# Patient Record
Sex: MALE | Race: WHITE | NOT HISPANIC OR LATINO | Employment: OTHER | URBAN - METROPOLITAN AREA
[De-identification: names, ages, dates, MRNs, and addresses within clinical notes are randomized per-mention and may not be internally consistent; named-entity substitution may affect disease eponyms.]

---

## 2019-06-21 ENCOUNTER — TELEPHONE (OUTPATIENT)
Dept: CARDIOLOGY | Facility: CLINIC | Age: 79
End: 2019-06-21

## 2019-06-21 NOTE — TELEPHONE ENCOUNTER
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  Was told it would be sent this morning and she hadn't received yet.  Advsd Dr. Cortez will be seeing pts this afternoon and would address.     Please fax to [phone]

## 2019-07-10 RX ORDER — WARFARIN SODIUM 5 MG/1
TABLET ORAL
Qty: 30 TABLET | Refills: 0 | Status: SHIPPED | OUTPATIENT
Start: 2019-07-10 | End: 2019-08-12 | Stop reason: SDUPTHER

## 2019-07-30 ENCOUNTER — ANTICOAGULATION VISIT (OUTPATIENT)
Dept: CARDIOLOGY | Facility: CLINIC | Age: 79
End: 2019-07-30

## 2019-07-30 DIAGNOSIS — Z79.01 LONG TERM (CURRENT) USE OF ANTICOAGULANTS: ICD-10-CM

## 2019-07-30 DIAGNOSIS — I48.20 CHRONIC ATRIAL FIBRILLATION (HCC): ICD-10-CM

## 2019-07-30 PROBLEM — I48.91 ATRIAL FIBRILLATION (HCC): Status: ACTIVE | Noted: 2019-07-30

## 2019-07-30 LAB — INR PPP: 2.45 (ref 2–3)

## 2019-08-02 ENCOUNTER — CLINICAL SUPPORT NO REQUIREMENTS (OUTPATIENT)
Dept: CARDIOLOGY | Facility: CLINIC | Age: 79
End: 2019-08-02

## 2019-08-02 DIAGNOSIS — I48.0 PAROXYSMAL ATRIAL FIBRILLATION (HCC): ICD-10-CM

## 2019-08-02 DIAGNOSIS — I49.5 TACHY-BRADY SYNDROME (HCC): Primary | ICD-10-CM

## 2019-08-02 PROCEDURE — 93280 PM DEVICE PROGR EVAL DUAL: CPT | Performed by: INTERNAL MEDICINE

## 2019-08-12 DIAGNOSIS — I48.0 PAROXYSMAL ATRIAL FIBRILLATION (HCC): Primary | ICD-10-CM

## 2019-08-12 DIAGNOSIS — Z79.01 LONG TERM (CURRENT) USE OF ANTICOAGULANTS: ICD-10-CM

## 2019-08-12 RX ORDER — WARFARIN SODIUM 5 MG/1
TABLET ORAL
Qty: 30 TABLET | Refills: 0 | Status: SHIPPED | OUTPATIENT
Start: 2019-08-12 | End: 2019-09-18 | Stop reason: SDUPTHER

## 2019-08-27 ENCOUNTER — ANTICOAGULATION VISIT (OUTPATIENT)
Dept: CARDIOLOGY | Facility: CLINIC | Age: 79
End: 2019-08-27

## 2019-08-27 DIAGNOSIS — Z79.01 LONG TERM (CURRENT) USE OF ANTICOAGULANTS: ICD-10-CM

## 2019-08-27 DIAGNOSIS — I48.0 PAROXYSMAL ATRIAL FIBRILLATION (HCC): ICD-10-CM

## 2019-08-27 LAB — INR PPP: 2.04 (ref 2–3)

## 2019-09-18 DIAGNOSIS — I48.0 PAROXYSMAL ATRIAL FIBRILLATION (HCC): ICD-10-CM

## 2019-09-18 DIAGNOSIS — Z79.01 LONG TERM (CURRENT) USE OF ANTICOAGULANTS: ICD-10-CM

## 2019-09-18 RX ORDER — WARFARIN SODIUM 5 MG/1
TABLET ORAL
COMMUNITY
Start: 2018-12-07 | End: 2019-10-23 | Stop reason: SDUPTHER

## 2019-09-18 RX ORDER — PREDNISOLONE ACETATE 10 MG/ML
SUSPENSION/ DROPS OPHTHALMIC
Refills: 3 | COMMUNITY
Start: 2019-06-19

## 2019-09-18 RX ORDER — FUROSEMIDE 40 MG/1
20 TABLET ORAL DAILY
Refills: 0 | COMMUNITY
Start: 2019-07-12 | End: 2021-04-20 | Stop reason: ALTCHOICE

## 2019-09-18 RX ORDER — MOXIFLOXACIN 5 MG/ML
SOLUTION/ DROPS OPHTHALMIC
Refills: 3 | COMMUNITY
Start: 2019-06-19 | End: 2020-07-07

## 2019-09-18 RX ORDER — POTASSIUM CHLORIDE 750 MG/1
10 TABLET, FILM COATED, EXTENDED RELEASE ORAL DAILY
Refills: 12 | COMMUNITY
Start: 2019-07-23 | End: 2021-04-20 | Stop reason: ALTCHOICE

## 2019-09-18 RX ORDER — WARFARIN SODIUM 5 MG/1
TABLET ORAL
Qty: 30 TABLET | Refills: 1 | Status: SHIPPED | OUTPATIENT
Start: 2019-09-18 | End: 2019-10-23 | Stop reason: SDUPTHER

## 2019-09-18 RX ORDER — MINOCYCLINE HYDROCHLORIDE 100 MG/1
TABLET ORAL
COMMUNITY
Start: 2016-11-29

## 2019-09-18 RX ORDER — ALPRAZOLAM 1 MG/1
TABLET ORAL EVERY 8 HOURS
COMMUNITY
Start: 2016-11-29

## 2019-09-18 RX ORDER — GABAPENTIN 300 MG/1
300 CAPSULE ORAL 3 TIMES DAILY
Refills: 6 | COMMUNITY
Start: 2019-07-08 | End: 2021-01-05

## 2019-09-18 RX ORDER — BISOPROLOL FUMARATE AND HYDROCHLOROTHIAZIDE 2.5; 6.25 MG/1; MG/1
TABLET ORAL EVERY 24 HOURS
COMMUNITY
Start: 2018-08-14

## 2019-09-18 RX ORDER — ERYTHROMYCIN 5 MG/G
OINTMENT OPHTHALMIC
Refills: 10 | COMMUNITY
Start: 2019-07-08 | End: 2020-07-07

## 2019-09-18 RX ORDER — TIMOLOL MALEATE 3.4 MG/ML
1 SOLUTION/ DROPS OPHTHALMIC
COMMUNITY
End: 2020-07-07

## 2019-09-18 RX ORDER — CILOSTAZOL 100 MG/1
100 TABLET ORAL 2 TIMES DAILY
Refills: 3 | COMMUNITY
Start: 2019-07-10 | End: 2019-10-15 | Stop reason: SDUPTHER

## 2019-09-18 RX ORDER — BACITRACIN ZINC AND POLYMYXIN B SULFATE 500; 10000 [USP'U]/G; [USP'U]/G
OINTMENT OPHTHALMIC
Refills: 6 | COMMUNITY
Start: 2019-07-02 | End: 2020-07-07

## 2019-09-18 RX ORDER — PRAVASTATIN SODIUM 40 MG
TABLET ORAL
COMMUNITY
Start: 2016-11-29

## 2019-09-18 RX ORDER — OMEPRAZOLE 40 MG/1
CAPSULE, DELAYED RELEASE ORAL
COMMUNITY
Start: 2016-11-29

## 2019-09-24 ENCOUNTER — OFFICE VISIT (OUTPATIENT)
Dept: CARDIOLOGY | Facility: CLINIC | Age: 79
End: 2019-09-24

## 2019-09-24 VITALS
DIASTOLIC BLOOD PRESSURE: 65 MMHG | WEIGHT: 255 LBS | HEART RATE: 87 BPM | SYSTOLIC BLOOD PRESSURE: 112 MMHG | OXYGEN SATURATION: 91 %

## 2019-09-24 DIAGNOSIS — Z95.0 PRESENCE OF CARDIAC PACEMAKER: ICD-10-CM

## 2019-09-24 DIAGNOSIS — I10 ESSENTIAL HYPERTENSION: ICD-10-CM

## 2019-09-24 DIAGNOSIS — E78.00 PURE HYPERCHOLESTEROLEMIA: ICD-10-CM

## 2019-09-24 DIAGNOSIS — I49.5 TACHY-BRADY SYNDROME (HCC): ICD-10-CM

## 2019-09-24 DIAGNOSIS — I48.0 PAROXYSMAL ATRIAL FIBRILLATION (HCC): Primary | ICD-10-CM

## 2019-09-24 PROCEDURE — 99213 OFFICE O/P EST LOW 20 MIN: CPT | Performed by: INTERNAL MEDICINE

## 2019-09-24 NOTE — PROGRESS NOTES
Subjective:     Encounter Date:09/24/2019      Patient ID: Casey Johnson is a 78 y.o. male.    Chief Complaint:  History of Present Illness 78-year-old white male with history of chronic atrial fibrillation with tachybrady syndrome status post pacemaker placement history of hypertension hyper lipidemia presents to my office for follow-up.  Patient is currently stable without any symptoms of chest pain but has some shortness of breath with exertion.  No complaints of any PND orthopnea.  No palpitations dizziness syncope or swelling of the feet.  Patient has been taking all the medicines regularly.  He does not smoke anymore.    The following portions of the patient's history were reviewed and updated as appropriate: allergies, current medications, past family history, past medical history, past social history, past surgical history and problem list.  Past Medical History:   Diagnosis Date   • Atrial fibrillation (CMS/HCC)    • Hyperlipidemia    • Hypertension      History reviewed. No pertinent surgical history.  /65 (BP Location: Left arm, Patient Position: Sitting)   Pulse 87   Wt 116 kg (255 lb)   SpO2 91%   History reviewed. No pertinent family history.    Current Outpatient Medications:   •  ALPRAZolam (XANAX) 1 MG tablet, Every 8 (Eight) Hours., Disp: , Rfl:   •  bisoprolol-hydrochlorothiazide (ZIAC) 2.5-6.25 MG per tablet, Daily., Disp: , Rfl:   •  cilostazol (PLETAL) 100 MG tablet, Take 100 mg by mouth 2 (Two) Times a Day., Disp: , Rfl: 3  •  erythromycin (ROMYCIN) 5 MG/GM ophthalmic ointment, apply A SMALL AMOUNT TO LEFT eyelid AT BEDTIME, Disp: , Rfl: 10  •  furosemide (LASIX) 20 MG tablet, Take 20 mg by mouth Daily., Disp: , Rfl: 0  •  gabapentin (NEURONTIN) 300 MG capsule, Take 300 mg by mouth 3 (Three) Times a Day., Disp: , Rfl: 6  •  minocycline (DYNACIN) 100 MG tablet, MINOCYCLINE  MG TABS, Disp: , Rfl:   •  moxifloxacin (VIGAMOX) 0.5 % ophthalmic solution, INSTILL ONE DROP FOUR  TIMES DAILY IN THE LEFT EYE FOR USE AFTER SURGERY, Disp: , Rfl: 3  •  neomycin-polymyxin-dexamethasone (MAXITROL) 0.1 % ophthalmic suspension, NEOMYCIN-POLYMYXIN-DEXAMETH 0.1 % SUSP, Disp: , Rfl:   •  omeprazole (priLOSEC) 40 MG capsule, OMEPRAZOLE 40 MG CPDR, Disp: , Rfl:   •  potassium chloride (K-DUR) 10 MEQ CR tablet, Take 10 mEq by mouth Daily., Disp: , Rfl: 12  •  pravastatin (PRAVACHOL) 40 MG tablet, PRAVASTATIN SODIUM 40 MG TABS, Disp: , Rfl:   •  Timolol Maleate PF 0.25 % solution, 1 drop., Disp: , Rfl:   •  warfarin (COUMADIN) 5 MG tablet, WARFARIN SODIUM 5 MG TABS, Disp: , Rfl:   •  aspirin 81 MG tablet, Take 81 mg by mouth Daily., Disp: , Rfl:   •  bacitracin-polymyxin b (POLYSPORIN) 500-82722 UNIT/GM ophthalmic ointment, APPLY SMALL AMOUNT TO LEFT EYE ONCE DAILY AT BEDTIME, Disp: , Rfl: 6  •  fluocinonide (LIDEX) 0.05 % cream, Apply 2 application(s) every day by topical route., Disp: , Rfl: 0  •  prednisoLONE acetate (PRED FORTE) 1 % ophthalmic suspension, INSTILL ONE DROP IN THE LEFT EYE FOUR TIMES DAILY, Disp: , Rfl: 3  •  warfarin (COUMADIN) 5 MG tablet, TAKE 1/2 TABLET BY MOUTH ON Monday AND Friday AND TAKE ONE TABLET BY MOUTH ON ALL other DAYS, OR AS directed, Disp: 30 tablet, Rfl: 1  Allergies   Allergen Reactions   • Penicillins Unknown (See Comments)     Social History     Socioeconomic History   • Marital status:      Spouse name: Not on file   • Number of children: Not on file   • Years of education: Not on file   • Highest education level: Not on file   Tobacco Use   • Smoking status: Former Smoker   Substance and Sexual Activity   • Alcohol use: Yes     Frequency: Never     Review of Systems   Constitution: Positive for malaise/fatigue. Negative for fever.   Cardiovascular: Negative for chest pain, dyspnea on exertion, leg swelling and palpitations.   Respiratory: Positive for shortness of breath. Negative for cough.    Skin: Negative for rash.   Gastrointestinal: Negative for  abdominal pain, nausea and vomiting.   Neurological: Positive for loss of balance. Negative for focal weakness and headaches.   All other systems reviewed and are negative.             Objective:     Physical Exam   Constitutional: He appears well-developed and well-nourished.   HENT:   Head: Normocephalic and atraumatic.   Eyes: Conjunctivae are normal. No scleral icterus.   Neck: Normal range of motion. Neck supple. No JVD present. Carotid bruit is not present.   Cardiovascular: Normal rate, regular rhythm, S1 normal, S2 normal, normal heart sounds and intact distal pulses. PMI is not displaced.   Pulmonary/Chest: Effort normal and breath sounds normal. He has no wheezes. He has no rales.   Abdominal: Soft. Bowel sounds are normal.   Neurological: He is alert. He has normal strength.   Skin: Skin is warm and dry. No rash noted.     Procedures    Lab Review:       Assessment:          Diagnosis Plan   1. Paroxysmal atrial fibrillation (CMS/HCC)     2. Tachy-tabatha syndrome (CMS/HCC)     3. Presence of cardiac pacemaker     4. Essential hypertension     5. Pure hypercholesterolemia            Plan:       Patient has history of paroxysmal atrial fibrillation with tachybrady syndrome and status post pacemaker placement  Patient's pacemaker is working very well.  Blood pressure and heart rate are stable.  Patient's lipid levels are followed by the primary care doctor

## 2019-10-08 ENCOUNTER — ANTICOAGULATION VISIT (OUTPATIENT)
Dept: CARDIOLOGY | Facility: CLINIC | Age: 79
End: 2019-10-08

## 2019-10-08 DIAGNOSIS — I48.0 PAROXYSMAL ATRIAL FIBRILLATION (HCC): ICD-10-CM

## 2019-10-08 DIAGNOSIS — Z79.01 LONG TERM (CURRENT) USE OF ANTICOAGULANTS: ICD-10-CM

## 2019-10-08 LAB — INR PPP: 3.25

## 2019-10-15 RX ORDER — CILOSTAZOL 100 MG/1
TABLET ORAL
Qty: 60 TABLET | Refills: 5 | Status: SHIPPED | OUTPATIENT
Start: 2019-10-15 | End: 2020-04-29

## 2019-10-23 DIAGNOSIS — Z79.01 LONG TERM (CURRENT) USE OF ANTICOAGULANTS: ICD-10-CM

## 2019-10-23 DIAGNOSIS — I48.0 PAROXYSMAL ATRIAL FIBRILLATION (HCC): ICD-10-CM

## 2019-10-23 RX ORDER — WARFARIN SODIUM 5 MG/1
TABLET ORAL
Qty: 30 TABLET | Refills: 1 | Status: SHIPPED | OUTPATIENT
Start: 2019-10-23 | End: 2019-12-31

## 2019-10-25 ENCOUNTER — ANTICOAGULATION VISIT (OUTPATIENT)
Dept: CARDIOLOGY | Facility: CLINIC | Age: 79
End: 2019-10-25

## 2019-10-25 DIAGNOSIS — I48.0 PAROXYSMAL ATRIAL FIBRILLATION (HCC): ICD-10-CM

## 2019-10-25 DIAGNOSIS — Z79.01 LONG TERM (CURRENT) USE OF ANTICOAGULANTS: ICD-10-CM

## 2019-10-25 LAB — INR PPP: 2.5

## 2019-11-27 ENCOUNTER — ANTICOAGULATION VISIT (OUTPATIENT)
Dept: CARDIOLOGY | Facility: CLINIC | Age: 79
End: 2019-11-27

## 2019-11-27 DIAGNOSIS — I48.0 PAROXYSMAL ATRIAL FIBRILLATION (HCC): ICD-10-CM

## 2019-11-27 DIAGNOSIS — Z79.01 LONG TERM (CURRENT) USE OF ANTICOAGULANTS: ICD-10-CM

## 2019-11-27 LAB — INR PPP: 2.73

## 2019-12-31 DIAGNOSIS — I48.0 PAROXYSMAL ATRIAL FIBRILLATION (HCC): ICD-10-CM

## 2019-12-31 DIAGNOSIS — Z79.01 LONG TERM (CURRENT) USE OF ANTICOAGULANTS: ICD-10-CM

## 2019-12-31 RX ORDER — WARFARIN SODIUM 5 MG/1
TABLET ORAL
Qty: 30 TABLET | Refills: 1 | Status: SHIPPED | OUTPATIENT
Start: 2019-12-31 | End: 2020-03-10

## 2020-01-02 ENCOUNTER — ANTICOAGULATION VISIT (OUTPATIENT)
Dept: CARDIOLOGY | Facility: CLINIC | Age: 80
End: 2020-01-02

## 2020-01-02 DIAGNOSIS — I48.0 PAROXYSMAL ATRIAL FIBRILLATION (HCC): ICD-10-CM

## 2020-01-02 DIAGNOSIS — Z79.01 LONG TERM (CURRENT) USE OF ANTICOAGULANTS: ICD-10-CM

## 2020-01-02 LAB — INR PPP: 2.67

## 2020-01-31 LAB — INR PPP: 3.09

## 2020-02-03 ENCOUNTER — ANTICOAGULATION VISIT (OUTPATIENT)
Dept: CARDIOLOGY | Facility: CLINIC | Age: 80
End: 2020-02-03

## 2020-02-03 DIAGNOSIS — I48.0 PAROXYSMAL ATRIAL FIBRILLATION (HCC): ICD-10-CM

## 2020-02-03 DIAGNOSIS — Z79.01 LONG TERM (CURRENT) USE OF ANTICOAGULANTS: ICD-10-CM

## 2020-02-07 ENCOUNTER — CLINICAL SUPPORT NO REQUIREMENTS (OUTPATIENT)
Dept: CARDIOLOGY | Facility: CLINIC | Age: 80
End: 2020-02-07

## 2020-02-07 DIAGNOSIS — I48.0 PAROXYSMAL ATRIAL FIBRILLATION (HCC): Primary | ICD-10-CM

## 2020-02-07 DIAGNOSIS — I49.5 TACHY-BRADY SYNDROME (HCC): ICD-10-CM

## 2020-02-07 DIAGNOSIS — Z95.0 PRESENCE OF CARDIAC PACEMAKER: ICD-10-CM

## 2020-02-07 PROCEDURE — 93280 PM DEVICE PROGR EVAL DUAL: CPT | Performed by: INTERNAL MEDICINE

## 2020-02-26 LAB — INR PPP: 2.24

## 2020-02-27 ENCOUNTER — ANTICOAGULATION VISIT (OUTPATIENT)
Dept: CARDIOLOGY | Facility: CLINIC | Age: 80
End: 2020-02-27

## 2020-02-27 DIAGNOSIS — I48.0 PAROXYSMAL ATRIAL FIBRILLATION (HCC): ICD-10-CM

## 2020-02-27 DIAGNOSIS — Z79.01 LONG TERM (CURRENT) USE OF ANTICOAGULANTS: ICD-10-CM

## 2020-03-10 DIAGNOSIS — I48.0 PAROXYSMAL ATRIAL FIBRILLATION (HCC): ICD-10-CM

## 2020-03-10 DIAGNOSIS — Z79.01 LONG TERM (CURRENT) USE OF ANTICOAGULANTS: ICD-10-CM

## 2020-03-10 RX ORDER — WARFARIN SODIUM 5 MG/1
TABLET ORAL
Qty: 30 TABLET | Refills: 1 | Status: SHIPPED | OUTPATIENT
Start: 2020-03-10 | End: 2020-04-24 | Stop reason: SDUPTHER

## 2020-04-02 ENCOUNTER — TELEPHONE (OUTPATIENT)
Dept: CARDIOLOGY | Facility: CLINIC | Age: 80
End: 2020-04-02

## 2020-04-02 NOTE — TELEPHONE ENCOUNTER
Patient did not receive call from Dr. Cortez on 3/31/2020. He would like call back. Also have questions about blood work.

## 2020-04-03 NOTE — TELEPHONE ENCOUNTER
Called pt. And his wife buddy.  Both of them are doing well.  Please reschedule in the office soon

## 2020-04-03 NOTE — TELEPHONE ENCOUNTER
Pt was supposed to have received a call from you 3/31 along with his wife who is also a patient  Mamie Elizabeth. [birth year]    Please advise.      Pushing out INR for 1 Month.

## 2020-04-24 DIAGNOSIS — I48.0 PAROXYSMAL ATRIAL FIBRILLATION (HCC): ICD-10-CM

## 2020-04-24 DIAGNOSIS — Z79.01 LONG TERM (CURRENT) USE OF ANTICOAGULANTS: ICD-10-CM

## 2020-04-27 RX ORDER — WARFARIN SODIUM 5 MG/1
TABLET ORAL
Qty: 30 TABLET | Refills: 1 | Status: SHIPPED | OUTPATIENT
Start: 2020-04-27 | End: 2020-04-28 | Stop reason: SDUPTHER

## 2020-04-28 ENCOUNTER — TELEPHONE (OUTPATIENT)
Dept: CARDIOLOGY | Facility: CLINIC | Age: 80
End: 2020-04-28

## 2020-04-28 DIAGNOSIS — I48.0 PAROXYSMAL ATRIAL FIBRILLATION (HCC): ICD-10-CM

## 2020-04-28 DIAGNOSIS — Z79.01 LONG TERM (CURRENT) USE OF ANTICOAGULANTS: ICD-10-CM

## 2020-04-28 RX ORDER — WARFARIN SODIUM 5 MG/1
TABLET ORAL
Qty: 90 TABLET | Refills: 0 | Status: SHIPPED | OUTPATIENT
Start: 2020-04-28 | End: 2020-07-29

## 2020-04-29 RX ORDER — CILOSTAZOL 100 MG/1
TABLET ORAL
Qty: 180 TABLET | Refills: 1 | Status: SHIPPED | OUTPATIENT
Start: 2020-04-29

## 2020-05-26 LAB — INR PPP: 2.3

## 2020-05-28 ENCOUNTER — ANTICOAGULATION VISIT (OUTPATIENT)
Dept: CARDIOLOGY | Facility: CLINIC | Age: 80
End: 2020-05-28

## 2020-05-28 DIAGNOSIS — I48.0 PAROXYSMAL ATRIAL FIBRILLATION (HCC): ICD-10-CM

## 2020-05-28 DIAGNOSIS — Z79.01 LONG TERM (CURRENT) USE OF ANTICOAGULANTS: ICD-10-CM

## 2020-06-25 ENCOUNTER — ANTICOAGULATION VISIT (OUTPATIENT)
Dept: CARDIOLOGY | Facility: CLINIC | Age: 80
End: 2020-06-25

## 2020-06-25 DIAGNOSIS — I48.0 PAROXYSMAL ATRIAL FIBRILLATION (HCC): ICD-10-CM

## 2020-06-25 DIAGNOSIS — Z79.01 LONG TERM (CURRENT) USE OF ANTICOAGULANTS: ICD-10-CM

## 2020-06-25 LAB — INR PPP: 2.1

## 2020-07-07 ENCOUNTER — OFFICE VISIT (OUTPATIENT)
Dept: CARDIOLOGY | Facility: CLINIC | Age: 80
End: 2020-07-07

## 2020-07-07 VITALS
HEIGHT: 71 IN | OXYGEN SATURATION: 95 % | DIASTOLIC BLOOD PRESSURE: 66 MMHG | BODY MASS INDEX: 35 KG/M2 | HEART RATE: 85 BPM | WEIGHT: 250 LBS | SYSTOLIC BLOOD PRESSURE: 125 MMHG

## 2020-07-07 DIAGNOSIS — I48.0 PAROXYSMAL ATRIAL FIBRILLATION (HCC): Primary | ICD-10-CM

## 2020-07-07 DIAGNOSIS — I10 ESSENTIAL HYPERTENSION: ICD-10-CM

## 2020-07-07 DIAGNOSIS — I49.5 TACHY-BRADY SYNDROME (HCC): ICD-10-CM

## 2020-07-07 DIAGNOSIS — E78.00 PURE HYPERCHOLESTEROLEMIA: ICD-10-CM

## 2020-07-07 DIAGNOSIS — Z95.0 PRESENCE OF CARDIAC PACEMAKER: ICD-10-CM

## 2020-07-07 PROCEDURE — 99213 OFFICE O/P EST LOW 20 MIN: CPT | Performed by: INTERNAL MEDICINE

## 2020-07-07 RX ORDER — MULTIPLE VITAMINS W/ MINERALS TAB 9MG-400MCG
1 TAB ORAL DAILY
COMMUNITY
End: 2021-01-05

## 2020-07-07 NOTE — PROGRESS NOTES
"    Subjective:     Encounter Date:07/07/2020      Patient ID: Casey Johnson is a 79 y.o. male.    Chief Complaint:  History of Present Illness 79-year-old white male with history of paroxysmal fibrillation with tachybradycardia syndrome status post pacemaker placement history of hypertension hyperlipidemia presents to my office for follow-up.  Patient is currently stable without any symptoms of chest pain or shortness of breath at rest or exertion.  No complains any PND orthopnea.  No palpitation dizziness syncope or swelling of the feet.  He is taking his medicines regularly.  His pacemaker is working with him.    The following portions of the patient's history were reviewed and updated as appropriate: allergies, current medications, past family history, past medical history, past social history, past surgical history and problem list.  Past Medical History:   Diagnosis Date   • Atrial fibrillation (CMS/HCC)    • Hyperlipidemia    • Hypertension      History reviewed. No pertinent surgical history.  /66 (BP Location: Left arm, Patient Position: Sitting)   Pulse 85   Ht 180.3 cm (71\")   Wt 113 kg (250 lb)   SpO2 95%   BMI 34.87 kg/m²   History reviewed. No pertinent family history.    Current Outpatient Medications:   •  ALPRAZolam (XANAX) 1 MG tablet, Every 8 (Eight) Hours., Disp: , Rfl:   •  bisoprolol-hydrochlorothiazide (ZIAC) 2.5-6.25 MG per tablet, Daily., Disp: , Rfl:   •  cilostazol (PLETAL) 100 MG tablet, TAKE ONE TABLET BY MOUTH TWICE DAILY, Disp: 180 tablet, Rfl: 1  •  furosemide (LASIX) 20 MG tablet, Take 20 mg by mouth Daily., Disp: , Rfl: 0  •  gabapentin (NEURONTIN) 300 MG capsule, Take 300 mg by mouth 3 (Three) Times a Day., Disp: , Rfl: 6  •  minocycline (DYNACIN) 100 MG tablet, MINOCYCLINE  MG TABS, Disp: , Rfl:   •  Multiple Vitamins-Minerals (MULTIVITAMIN WITH MINERALS) tablet tablet, Take 1 tablet by mouth Daily., Disp: , Rfl:   •  neomycin-polymyxin-dexamethasone (MAXITROL) " 0.1 % ophthalmic suspension, NEOMYCIN-POLYMYXIN-DEXAMETH 0.1 % SUSP, Disp: , Rfl:   •  omeprazole (priLOSEC) 40 MG capsule, OMEPRAZOLE 40 MG CPDR, Disp: , Rfl:   •  potassium chloride (K-DUR) 10 MEQ CR tablet, Take 10 mEq by mouth Daily., Disp: , Rfl: 12  •  pravastatin (PRAVACHOL) 40 MG tablet, PRAVASTATIN SODIUM 40 MG TABS, Disp: , Rfl:   •  prednisoLONE acetate (PRED FORTE) 1 % ophthalmic suspension, INSTILL ONE DROP IN THE LEFT EYE FOUR TIMES DAILY, Disp: , Rfl: 3  •  warfarin (COUMADIN) 5 MG tablet, TAKE 1/2 TAB BY MOUTH ON Monday AND Friday. TAKE ONE TABLET BY MOUTH ON ALL OTHER DAYS, OR AS DIRECTED, Disp: 90 tablet, Rfl: 0  Allergies   Allergen Reactions   • Penicillins Unknown (See Comments)   • Sulfamethoxazole-Trimethoprim Hives     Social History     Socioeconomic History   • Marital status:      Spouse name: Not on file   • Number of children: Not on file   • Years of education: Not on file   • Highest education level: Not on file   Tobacco Use   • Smoking status: Former Smoker   • Smokeless tobacco: Never Used   Substance and Sexual Activity   • Alcohol use: Yes     Frequency: Never     Review of Systems   Constitution: Negative for fever and malaise/fatigue.   Cardiovascular: Negative for chest pain, dyspnea on exertion, leg swelling and palpitations.   Respiratory: Negative for cough and shortness of breath.    Skin: Negative for rash.   Gastrointestinal: Negative for abdominal pain, nausea and vomiting.   Neurological: Negative for focal weakness and headaches.   All other systems reviewed and are negative.             Objective:     Physical Exam   Constitutional: He appears well-developed and well-nourished.   HENT:   Head: Normocephalic and atraumatic.   Eyes: Conjunctivae are normal. No scleral icterus.   Neck: Normal range of motion. Neck supple. No JVD present. Carotid bruit is not present.   Cardiovascular: Normal rate, regular rhythm, S1 normal, S2 normal, normal heart sounds and  intact distal pulses. PMI is not displaced.   Pulmonary/Chest: Effort normal and breath sounds normal. He has no wheezes. He has no rales.   Abdominal: Soft. Bowel sounds are normal.   Neurological: He is alert. He has normal strength.   Skin: Skin is warm and dry. No rash noted.     Procedures    Lab Review:       Assessment:          Diagnosis Plan   1. Paroxysmal atrial fibrillation (CMS/HCC)     2. Tachy-tabatha syndrome (CMS/HCC)     3. Presence of cardiac pacemaker     4. Essential hypertension     5. Pure hypercholesterolemia            Plan:       Has history paroxysmal atrial fibrillation with tachybradycardia  syndrome status post pacemaker placement  Patient's pacemaker is working very well  Patient's blood pressure and heart rate stable  Patient's lipid levels are followed by the primary care doctor.  Continue current medicines and follow in 6 months.

## 2020-07-29 DIAGNOSIS — Z79.01 LONG TERM (CURRENT) USE OF ANTICOAGULANTS: ICD-10-CM

## 2020-07-29 DIAGNOSIS — I48.0 PAROXYSMAL ATRIAL FIBRILLATION (HCC): ICD-10-CM

## 2020-07-29 RX ORDER — WARFARIN SODIUM 5 MG/1
TABLET ORAL
Qty: 30 TABLET | Refills: 1 | Status: SHIPPED | OUTPATIENT
Start: 2020-07-29 | End: 2020-10-07

## 2020-07-31 ENCOUNTER — ANTICOAGULATION VISIT (OUTPATIENT)
Dept: CARDIOLOGY | Facility: CLINIC | Age: 80
End: 2020-07-31

## 2020-07-31 DIAGNOSIS — Z79.01 LONG TERM (CURRENT) USE OF ANTICOAGULANTS: ICD-10-CM

## 2020-07-31 DIAGNOSIS — I48.0 PAROXYSMAL ATRIAL FIBRILLATION (HCC): ICD-10-CM

## 2020-07-31 LAB — INR PPP: 2.56

## 2020-08-07 ENCOUNTER — CLINICAL SUPPORT NO REQUIREMENTS (OUTPATIENT)
Dept: CARDIOLOGY | Facility: CLINIC | Age: 80
End: 2020-08-07

## 2020-08-07 DIAGNOSIS — I49.5 TACHY-BRADY SYNDROME (HCC): ICD-10-CM

## 2020-08-07 DIAGNOSIS — I48.0 PAROXYSMAL ATRIAL FIBRILLATION (HCC): Primary | ICD-10-CM

## 2020-08-07 DIAGNOSIS — Z95.0 PRESENCE OF CARDIAC PACEMAKER: ICD-10-CM

## 2020-08-07 PROCEDURE — 93280 PM DEVICE PROGR EVAL DUAL: CPT | Performed by: INTERNAL MEDICINE

## 2020-09-02 ENCOUNTER — ANTICOAGULATION VISIT (OUTPATIENT)
Dept: CARDIOLOGY | Facility: CLINIC | Age: 80
End: 2020-09-02

## 2020-09-02 DIAGNOSIS — Z79.01 LONG TERM (CURRENT) USE OF ANTICOAGULANTS: ICD-10-CM

## 2020-09-02 DIAGNOSIS — I48.0 PAROXYSMAL ATRIAL FIBRILLATION (HCC): ICD-10-CM

## 2020-09-02 LAB — INR PPP: 2.38

## 2020-09-30 ENCOUNTER — ANTICOAGULATION VISIT (OUTPATIENT)
Dept: CARDIOLOGY | Facility: CLINIC | Age: 80
End: 2020-09-30

## 2020-09-30 DIAGNOSIS — Z79.01 LONG TERM (CURRENT) USE OF ANTICOAGULANTS: ICD-10-CM

## 2020-09-30 DIAGNOSIS — I48.0 PAROXYSMAL ATRIAL FIBRILLATION (HCC): ICD-10-CM

## 2020-09-30 LAB — INR PPP: 2.95

## 2020-10-07 DIAGNOSIS — Z79.01 LONG TERM (CURRENT) USE OF ANTICOAGULANTS: ICD-10-CM

## 2020-10-07 DIAGNOSIS — I48.0 PAROXYSMAL ATRIAL FIBRILLATION (HCC): ICD-10-CM

## 2020-10-07 RX ORDER — WARFARIN SODIUM 5 MG/1
TABLET ORAL
Qty: 30 TABLET | Refills: 2 | Status: SHIPPED | OUTPATIENT
Start: 2020-10-07 | End: 2021-01-13

## 2020-10-28 ENCOUNTER — ANTICOAGULATION VISIT (OUTPATIENT)
Dept: CARDIOLOGY | Facility: CLINIC | Age: 80
End: 2020-10-28

## 2020-10-28 DIAGNOSIS — Z79.01 LONG TERM (CURRENT) USE OF ANTICOAGULANTS: ICD-10-CM

## 2020-10-28 DIAGNOSIS — I48.0 PAROXYSMAL ATRIAL FIBRILLATION (HCC): ICD-10-CM

## 2020-10-28 LAB — INR PPP: 2.77

## 2020-11-25 ENCOUNTER — ANTICOAGULATION VISIT (OUTPATIENT)
Dept: CARDIOLOGY | Facility: CLINIC | Age: 80
End: 2020-11-25

## 2020-11-25 DIAGNOSIS — I48.0 PAROXYSMAL ATRIAL FIBRILLATION (HCC): ICD-10-CM

## 2020-11-25 DIAGNOSIS — Z79.01 LONG TERM (CURRENT) USE OF ANTICOAGULANTS: ICD-10-CM

## 2020-11-25 LAB — INR PPP: 3.14

## 2021-01-04 ENCOUNTER — ANTICOAGULATION VISIT (OUTPATIENT)
Dept: CARDIOLOGY | Facility: CLINIC | Age: 81
End: 2021-01-04

## 2021-01-04 DIAGNOSIS — Z79.01 LONG TERM (CURRENT) USE OF ANTICOAGULANTS: ICD-10-CM

## 2021-01-04 DIAGNOSIS — I48.0 PAROXYSMAL ATRIAL FIBRILLATION (HCC): ICD-10-CM

## 2021-01-04 LAB — INR PPP: 2.85

## 2021-01-05 ENCOUNTER — OFFICE VISIT (OUTPATIENT)
Dept: CARDIOLOGY | Facility: CLINIC | Age: 81
End: 2021-01-05

## 2021-01-05 VITALS
OXYGEN SATURATION: 97 % | SYSTOLIC BLOOD PRESSURE: 101 MMHG | HEIGHT: 71 IN | WEIGHT: 233 LBS | HEART RATE: 69 BPM | DIASTOLIC BLOOD PRESSURE: 59 MMHG | BODY MASS INDEX: 32.62 KG/M2

## 2021-01-05 DIAGNOSIS — Z95.0 PRESENCE OF CARDIAC PACEMAKER: ICD-10-CM

## 2021-01-05 DIAGNOSIS — I10 ESSENTIAL HYPERTENSION: ICD-10-CM

## 2021-01-05 DIAGNOSIS — E78.00 PURE HYPERCHOLESTEROLEMIA: ICD-10-CM

## 2021-01-05 DIAGNOSIS — I49.5 TACHY-BRADY SYNDROME (HCC): ICD-10-CM

## 2021-01-05 DIAGNOSIS — I48.0 PAROXYSMAL ATRIAL FIBRILLATION (HCC): Primary | ICD-10-CM

## 2021-01-05 PROBLEM — I25.10 CORONARY ATHEROSCLEROSIS: Status: ACTIVE | Noted: 2021-01-05

## 2021-01-05 PROBLEM — G47.30 SLEEP APNEA: Status: ACTIVE | Noted: 2021-01-05

## 2021-01-05 PROBLEM — I49.9 CARDIAC ARRHYTHMIA: Status: ACTIVE | Noted: 2019-01-14

## 2021-01-05 PROCEDURE — 99214 OFFICE O/P EST MOD 30 MIN: CPT | Performed by: INTERNAL MEDICINE

## 2021-01-05 RX ORDER — OFLOXACIN 3 MG/ML
1 SOLUTION/ DROPS OPHTHALMIC 4 TIMES DAILY
COMMUNITY
End: 2021-04-20 | Stop reason: DRUGHIGH

## 2021-01-05 NOTE — PROGRESS NOTES
"    Subjective:     Encounter Date:01/05/2021      Patient ID: Casey Johnson is a 80 y.o. male.    Chief Complaint:  History of Present Illness 80-year-old white male with history of atrial fibrillation history of tachybradycardia syndrome status post pacemaker placement history of hypertension hyperlipidemia presents to my office for follow-up.  Patient is currently stable without any symptoms of chest pain or shortness of breath at rest on exertion.  No complains of any PND orthopnea.  No palpitation dizziness syncope.  He has some swelling of the feet but is taking his medicines regularly.  He does not smoke.  He is active and walks around easily.  He does not smoke.    The following portions of the patient's history were reviewed and updated as appropriate: allergies, current medications, past family history, past medical history, past social history, past surgical history and problem list.  Past Medical History:   Diagnosis Date   • Atrial fibrillation (CMS/HCC)    • Hyperlipidemia    • Hypertension      History reviewed. No pertinent surgical history.  /59 (BP Location: Left arm, Patient Position: Sitting)   Pulse 69   Ht 180.3 cm (71\")   Wt 106 kg (233 lb)   SpO2 97%   BMI 32.50 kg/m²   History reviewed. No pertinent family history.    Current Outpatient Medications:   •  ALPRAZolam (XANAX) 1 MG tablet, Every 8 (Eight) Hours., Disp: , Rfl:   •  bisoprolol-hydrochlorothiazide (ZIAC) 2.5-6.25 MG per tablet, Daily., Disp: , Rfl:   •  cilostazol (PLETAL) 100 MG tablet, TAKE ONE TABLET BY MOUTH TWICE DAILY, Disp: 180 tablet, Rfl: 1  •  furosemide (LASIX) 40 MG tablet, Take 20 mg by mouth Daily., Disp: , Rfl: 0  •  minocycline (DYNACIN) 100 MG tablet, MINOCYCLINE  MG TABS, Disp: , Rfl:   •  ofloxacin (OCUFLOX) 0.3 % ophthalmic solution, 1 drop 4 (Four) Times a Day., Disp: , Rfl:   •  omeprazole (priLOSEC) 40 MG capsule, OMEPRAZOLE 40 MG CPDR, Disp: , Rfl:   •  potassium chloride (K-DUR) 10 MEQ CR " tablet, Take 10 mEq by mouth Daily., Disp: , Rfl: 12  •  pravastatin (PRAVACHOL) 40 MG tablet, PRAVASTATIN SODIUM 40 MG TABS, Disp: , Rfl:   •  prednisoLONE acetate (PRED FORTE) 1 % ophthalmic suspension, INSTILL ONE DROP IN THE LEFT EYE FOUR TIMES DAILY, Disp: , Rfl: 3  •  warfarin (COUMADIN) 5 MG tablet, TAKE 1/2 TAB BY MOUTH ON Monday AND Friday. TAKE ONE TABLET BY MOUTH ON ALL OTHER DAYS, OR AS DIRECTED, Disp: 30 tablet, Rfl: 2  Allergies   Allergen Reactions   • Penicillins Unknown (See Comments)   • Sulfamethoxazole-Trimethoprim Hives     Social History     Socioeconomic History   • Marital status:      Spouse name: Not on file   • Number of children: Not on file   • Years of education: Not on file   • Highest education level: Not on file   Tobacco Use   • Smoking status: Former Smoker   • Smokeless tobacco: Never Used   Substance and Sexual Activity   • Alcohol use: Yes     Frequency: Never     Review of Systems   Constitution: Negative for fever and malaise/fatigue.   Cardiovascular: Negative for chest pain, dyspnea on exertion and palpitations.   Respiratory: Negative for cough and shortness of breath.    Skin: Negative for rash.   Gastrointestinal: Negative for abdominal pain, nausea and vomiting.   Neurological: Negative for focal weakness and headaches.   All other systems reviewed and are negative.             Objective:     Constitutional:       Appearance: Well-developed.   Eyes:      General: No scleral icterus.     Conjunctiva/sclera: Conjunctivae normal.   HENT:      Head: Normocephalic and atraumatic.   Neck:      Musculoskeletal: Normal range of motion and neck supple.      Vascular: No carotid bruit or JVD.   Pulmonary:      Effort: Pulmonary effort is normal.      Breath sounds: Normal breath sounds. No wheezing. No rales.   Cardiovascular:      Normal rate. Regular rhythm.      Murmurs: There is a systolic murmur.   Pulses:     Intact distal pulses.   Abdominal:      General: Bowel  sounds are normal.      Palpations: Abdomen is soft.   Skin:     General: Skin is warm and dry.      Findings: No rash.   Neurological:      Mental Status: Alert.       Procedures    Lab Review:         MDM   #1 atrial fibrillation  Patient has chronic atrial fibrillation and is currently on beta-blockers and warfarin and keeps the INR between 2.0-3.0  Patient understands the risk of stroke versus risk of bleeding  2.  Hypertension   patient's blood pressure is currently stable on medical therapy including beta-blockers and diuretics  3.  Hyperlipidemia  Patient is currently stable on medical therapy with pravastatin and his lipid levels are followed by the primary care doctor  4.  Pacemaker placement  Patient had tachybradycardia syndrome with atrial fibrillation and hence he underwent a permanent pacemaker placement and his pacemaker is working very well.  Continue current medicines and follow him in 6 months

## 2021-01-13 DIAGNOSIS — I48.0 PAROXYSMAL ATRIAL FIBRILLATION (HCC): ICD-10-CM

## 2021-01-13 DIAGNOSIS — Z79.01 LONG TERM (CURRENT) USE OF ANTICOAGULANTS: ICD-10-CM

## 2021-01-13 RX ORDER — WARFARIN SODIUM 5 MG/1
TABLET ORAL
Qty: 30 TABLET | Refills: 2 | Status: SHIPPED | OUTPATIENT
Start: 2021-01-13 | End: 2021-04-26

## 2021-02-05 ENCOUNTER — CLINICAL SUPPORT NO REQUIREMENTS (OUTPATIENT)
Dept: CARDIOLOGY | Facility: CLINIC | Age: 81
End: 2021-02-05

## 2021-02-05 DIAGNOSIS — Z95.0 STATUS POST CARDIAC PACEMAKER PROCEDURE: ICD-10-CM

## 2021-02-05 DIAGNOSIS — Z95.0 PRESENCE OF CARDIAC PACEMAKER: ICD-10-CM

## 2021-02-05 DIAGNOSIS — I49.5 TACHY-BRADY SYNDROME (HCC): Primary | ICD-10-CM

## 2021-02-05 PROCEDURE — 93280 PM DEVICE PROGR EVAL DUAL: CPT | Performed by: INTERNAL MEDICINE

## 2021-02-10 ENCOUNTER — TELEPHONE (OUTPATIENT)
Dept: CARDIOLOGY | Facility: CLINIC | Age: 81
End: 2021-02-10

## 2021-02-15 ENCOUNTER — ANTICOAGULATION VISIT (OUTPATIENT)
Dept: CARDIOLOGY | Facility: CLINIC | Age: 81
End: 2021-02-15

## 2021-02-15 DIAGNOSIS — I48.0 PAROXYSMAL ATRIAL FIBRILLATION (HCC): ICD-10-CM

## 2021-02-15 DIAGNOSIS — Z79.01 LONG TERM (CURRENT) USE OF ANTICOAGULANTS: ICD-10-CM

## 2021-02-15 LAB — INR PPP: 2.58

## 2021-03-24 ENCOUNTER — TELEPHONE (OUTPATIENT)
Dept: CARDIOLOGY | Facility: CLINIC | Age: 81
End: 2021-03-24

## 2021-03-30 ENCOUNTER — ANTICOAGULATION VISIT (OUTPATIENT)
Dept: CARDIOLOGY | Facility: CLINIC | Age: 81
End: 2021-03-30

## 2021-03-30 DIAGNOSIS — I48.0 PAROXYSMAL ATRIAL FIBRILLATION (HCC): ICD-10-CM

## 2021-03-30 DIAGNOSIS — Z79.01 LONG TERM (CURRENT) USE OF ANTICOAGULANTS: ICD-10-CM

## 2021-03-30 LAB — INR PPP: 2.9

## 2021-04-05 ENCOUNTER — TELEPHONE (OUTPATIENT)
Dept: CARDIOLOGY | Facility: CLINIC | Age: 81
End: 2021-04-05

## 2021-04-05 DIAGNOSIS — R00.2 PALPITATIONS: Primary | ICD-10-CM

## 2021-04-05 NOTE — TELEPHONE ENCOUNTER
WAS ADVISED BY [facility] OFFICE TO CALL US TO SEE IF WE CAN OVERBOOK. NEEDS F/U WITH DR. JIMENEZ. HAD UPPER/LOWER SCOPE LAST WEEK AT [facility] AND HIS HEART RATE WAS ALL OVER THE PLACE. HE CALLED [facility] AND TOLD IT WOULD BE END OF April BEFORE APT.

## 2021-04-20 ENCOUNTER — OUTSIDE FACILITY SERVICE (OUTPATIENT)
Dept: CARDIOLOGY | Facility: CLINIC | Age: 81
End: 2021-04-20

## 2021-04-20 ENCOUNTER — OFFICE VISIT (OUTPATIENT)
Dept: CARDIOLOGY | Facility: CLINIC | Age: 81
End: 2021-04-20

## 2021-04-20 VITALS
BODY MASS INDEX: 32.06 KG/M2 | SYSTOLIC BLOOD PRESSURE: 124 MMHG | WEIGHT: 229 LBS | DIASTOLIC BLOOD PRESSURE: 60 MMHG | HEART RATE: 72 BPM | OXYGEN SATURATION: 97 % | HEIGHT: 71 IN

## 2021-04-20 DIAGNOSIS — I10 ESSENTIAL HYPERTENSION: ICD-10-CM

## 2021-04-20 DIAGNOSIS — R00.2 PALPITATIONS: Primary | ICD-10-CM

## 2021-04-20 DIAGNOSIS — Z95.0 PRESENCE OF CARDIAC PACEMAKER: ICD-10-CM

## 2021-04-20 DIAGNOSIS — I48.0 PAROXYSMAL ATRIAL FIBRILLATION (HCC): ICD-10-CM

## 2021-04-20 DIAGNOSIS — E78.00 PURE HYPERCHOLESTEROLEMIA: ICD-10-CM

## 2021-04-20 DIAGNOSIS — I49.5 TACHY-BRADY SYNDROME (HCC): ICD-10-CM

## 2021-04-20 PROCEDURE — 99214 OFFICE O/P EST MOD 30 MIN: CPT | Performed by: INTERNAL MEDICINE

## 2021-04-20 PROCEDURE — 93227 XTRNL ECG REC<48 HR R&I: CPT | Performed by: INTERNAL MEDICINE

## 2021-04-20 RX ORDER — ERYTHROMYCIN 5 MG/G
OINTMENT OPHTHALMIC NIGHTLY
COMMUNITY

## 2021-04-20 RX ORDER — DIPHENOXYLATE HYDROCHLORIDE AND ATROPINE SULFATE 2.5; .025 MG/1; MG/1
TABLET ORAL DAILY
COMMUNITY
End: 2021-07-13

## 2021-04-20 RX ORDER — NEOMYCIN SULFATE, POLYMYXIN B SULFATE, AND DEXAMETHASONE 3.5; 10000; 1 MG/G; [USP'U]/G; MG/G
OINTMENT OPHTHALMIC DAILY
COMMUNITY
End: 2021-07-13

## 2021-04-20 RX ORDER — TERBINAFINE HYDROCHLORIDE 250 MG/1
250 TABLET ORAL DAILY
COMMUNITY
End: 2021-07-13

## 2021-04-20 NOTE — PROGRESS NOTES
"    Subjective:     Encounter Date:04/20/2021      Patient ID: Casey Johnson is a 80 y.o. male.    Chief Complaint:  History of Present Illness 80-year-old white male with history of paroxysmal atrial fibrillation history of hypertension hyperlipidemia and history of TAVR sternal status post pacemaker placement presents to my office for follow-up.  Patient is currently stable without any signs of chest pain or shortness of breath at rest on exertion.  No complaints any PND orthopnea.  Patient has occasional palpitations.  No dizziness syncope or swelling of the feet.  Patient has been taking all her meds regularly.  He had a recent Holter monitor which showed a ventricular pacemaker rhythm but had occasional episodes of nonspecific arrhythmias and also frequent PVCs.  He is already on beta-blockers.    The following portions of the patient's history were reviewed and updated as appropriate: allergies, current medications, past family history, past medical history, past social history, past surgical history and problem list.  Past Medical History:   Diagnosis Date   • Atrial fibrillation (CMS/HCC)    • Cancer (CMS/HCC)     SKIN   • Hyperlipidemia    • Hypertension      Past Surgical History:   Procedure Laterality Date   • CATARACT EXTRACTION, BILATERAL     • EYE SURGERY     • GLAUCOMA SURGERY      X3   • INSERT / REPLACE / REMOVE PACEMAKER     • MOHS SURGERY     • PROSTATE SURGERY       /60 (BP Location: Left arm, Patient Position: Sitting, Cuff Size: Adult)   Pulse 72   Ht 180.3 cm (71\")   Wt 104 kg (229 lb)   SpO2 97%   BMI 31.94 kg/m²   Family History   Problem Relation Age of Onset   • Osteoporosis Mother    • Bell's palsy Mother    • No Known Problems Father    • No Known Problems Sister    • Cancer Brother    • Heart attack Brother    • No Known Problems Maternal Aunt    • No Known Problems Maternal Uncle    • No Known Problems Paternal Aunt    • No Known Problems Paternal Uncle    • No Known " Problems Maternal Grandmother    • No Known Problems Maternal Grandfather    • No Known Problems Paternal Grandmother    • No Known Problems Paternal Grandfather    • No Known Problems Other    • Anemia Neg Hx    • Arrhythmia Neg Hx    • Asthma Neg Hx    • Clotting disorder Neg Hx    • Fainting Neg Hx    • Heart disease Neg Hx    • Heart failure Neg Hx    • Hyperlipidemia Neg Hx    • Hypertension Neg Hx        Current Outpatient Medications:   •  ALPRAZolam (XANAX) 1 MG tablet, Every 8 (Eight) Hours., Disp: , Rfl:   •  bisoprolol-hydrochlorothiazide (ZIAC) 2.5-6.25 MG per tablet, Daily., Disp: , Rfl:   •  cilostazol (PLETAL) 100 MG tablet, TAKE ONE TABLET BY MOUTH TWICE DAILY, Disp: 180 tablet, Rfl: 1  •  erythromycin (ROMYCIN) 5 MG/GM ophthalmic ointment, Administer  into the left eye Every Night., Disp: , Rfl:   •  minocycline (DYNACIN) 100 MG tablet, MINOCYCLINE  MG TABS, Disp: , Rfl:   •  multivitamin (MULTI-VITAMIN PO), Take  by mouth Daily., Disp: , Rfl:   •  neomycin-polymyxin-dexamethamethasone (POLYDEX) 3.5-14017-7.1 ointment ophthalmic ointment, Daily., Disp: , Rfl:   •  omeprazole (priLOSEC) 40 MG capsule, OMEPRAZOLE 40 MG CPDR, Disp: , Rfl:   •  pravastatin (PRAVACHOL) 40 MG tablet, PRAVASTATIN SODIUM 40 MG TABS, Disp: , Rfl:   •  prednisoLONE acetate (PRED FORTE) 1 % ophthalmic suspension, INSTILL ONE DROP IN THE LEFT EYE FOUR TIMES DAILY, Disp: , Rfl: 3  •  terbinafine (lamiSIL) 250 MG tablet, Take 250 mg by mouth Daily., Disp: , Rfl:   •  Unable to find, 1 each 1 (One) Time. Med Name: KETOCONAZOLE 1% OTIC SOLUTION, Disp: , Rfl:   •  Unable to find, 1 each 1 (One) Time. Med Name: AUTOLOGOUS 20% SERUM, Disp: , Rfl:   •  warfarin (COUMADIN) 5 MG tablet, TAKE 1/2 TAB BY MOUTH ON Monday AND Friday. TAKE ONE TABLET BY MOUTH ON ALL OTHER DAYS, OR AS DIRECTED, Disp: 30 tablet, Rfl: 2  Allergies   Allergen Reactions   • Penicillins Unknown (See Comments)   • Sulfamethoxazole-Trimethoprim Hives      Social History     Socioeconomic History   • Marital status:      Spouse name: Not on file   • Number of children: Not on file   • Years of education: Not on file   • Highest education level: Not on file   Tobacco Use   • Smoking status: Former Smoker   • Smokeless tobacco: Never Used   Vaping Use   • Vaping Use: Never used   Substance and Sexual Activity   • Alcohol use: Yes   • Drug use: Never   • Sexual activity: Defer     Review of Systems   Constitutional: Negative for fever and malaise/fatigue.   HENT: Negative for congestion and hearing loss.    Eyes: Negative for double vision and visual disturbance.   Cardiovascular: Positive for palpitations. Negative for chest pain, claudication, dyspnea on exertion, leg swelling and syncope.   Respiratory: Negative for cough and shortness of breath.    Endocrine: Negative for cold intolerance.   Skin: Negative for color change and rash.   Musculoskeletal: Negative for arthritis and joint pain.   Gastrointestinal: Negative for abdominal pain, heartburn, nausea and vomiting.   Genitourinary: Negative for hematuria.   Neurological: Negative for excessive daytime sleepiness, dizziness, focal weakness and headaches.   Psychiatric/Behavioral: Negative for depression. The patient is not nervous/anxious.    All other systems reviewed and are negative.             Objective:     Constitutional:       Appearance: Well-developed.   Eyes:      General: No scleral icterus.     Conjunctiva/sclera: Conjunctivae normal.   HENT:      Head: Normocephalic and atraumatic.   Neck:      Vascular: No carotid bruit or JVD.   Pulmonary:      Effort: Pulmonary effort is normal.      Breath sounds: Normal breath sounds. No wheezing. No rales.   Cardiovascular:      Normal rate. Regular rhythm.   Pulses:     Intact distal pulses.   Abdominal:      General: Bowel sounds are normal.      Palpations: Abdomen is soft.   Musculoskeletal:      Cervical back: Normal range of motion and neck  supple. Skin:     General: Skin is warm and dry.      Findings: No rash.   Neurological:      Mental Status: Alert.       Procedures    Lab Review:         MDM   #1 palpitations  Patient had frequent PVCs and hence I will increase his beta-blockers  2.  History of paroxysmal afibrillation  Patient is currently stable on beta-blockers and warfarin keep his INR between 2.0-3.0  3.  Hypertension excellent patient blood pressure currently stable on medications   #4 hyperlipidemia  Patient lipid levels are followed by the primary care doctor  5.  Pacemaker placement  Patient had TTP syndrome and hence underwent a permanent pacemaker placement and his pacemaker is working very well.

## 2021-04-26 ENCOUNTER — TELEPHONE (OUTPATIENT)
Dept: CARDIOLOGY | Facility: CLINIC | Age: 81
End: 2021-04-26

## 2021-04-26 DIAGNOSIS — Z79.01 LONG TERM (CURRENT) USE OF ANTICOAGULANTS: ICD-10-CM

## 2021-04-26 DIAGNOSIS — I48.0 PAROXYSMAL ATRIAL FIBRILLATION (HCC): ICD-10-CM

## 2021-04-26 RX ORDER — WARFARIN SODIUM 5 MG/1
TABLET ORAL
Qty: 30 TABLET | Refills: 2 | Status: SHIPPED | OUTPATIENT
Start: 2021-04-26 | End: 2021-08-17

## 2021-05-05 ENCOUNTER — TELEPHONE (OUTPATIENT)
Dept: CARDIOLOGY | Facility: CLINIC | Age: 81
End: 2021-05-05

## 2021-05-07 ENCOUNTER — ANTICOAGULATION VISIT (OUTPATIENT)
Dept: CARDIOLOGY | Facility: CLINIC | Age: 81
End: 2021-05-07

## 2021-05-07 DIAGNOSIS — Z79.01 LONG TERM (CURRENT) USE OF ANTICOAGULANTS: Primary | ICD-10-CM

## 2021-05-07 LAB — INR PPP: 2.01

## 2021-06-07 ENCOUNTER — ANTICOAGULATION VISIT (OUTPATIENT)
Dept: CARDIOLOGY | Facility: CLINIC | Age: 81
End: 2021-06-07

## 2021-06-07 DIAGNOSIS — Z79.01 LONG TERM (CURRENT) USE OF ANTICOAGULANTS: Primary | ICD-10-CM

## 2021-06-07 LAB — INR PPP: 2.46

## 2021-07-07 ENCOUNTER — TELEPHONE (OUTPATIENT)
Dept: CARDIOLOGY | Facility: CLINIC | Age: 81
End: 2021-07-07

## 2021-07-07 DIAGNOSIS — I48.0 PAROXYSMAL ATRIAL FIBRILLATION (HCC): Primary | ICD-10-CM

## 2021-07-07 DIAGNOSIS — Z79.01 LONG TERM (CURRENT) USE OF ANTICOAGULANTS: ICD-10-CM

## 2021-07-13 ENCOUNTER — OFFICE VISIT (OUTPATIENT)
Dept: CARDIOLOGY | Facility: CLINIC | Age: 81
End: 2021-07-13

## 2021-07-13 VITALS
SYSTOLIC BLOOD PRESSURE: 110 MMHG | HEIGHT: 71 IN | HEART RATE: 70 BPM | WEIGHT: 219 LBS | BODY MASS INDEX: 30.66 KG/M2 | DIASTOLIC BLOOD PRESSURE: 64 MMHG | OXYGEN SATURATION: 94 %

## 2021-07-13 DIAGNOSIS — I49.5 TACHY-BRADY SYNDROME (HCC): ICD-10-CM

## 2021-07-13 DIAGNOSIS — I10 ESSENTIAL HYPERTENSION: ICD-10-CM

## 2021-07-13 DIAGNOSIS — Z95.0 PRESENCE OF CARDIAC PACEMAKER: ICD-10-CM

## 2021-07-13 DIAGNOSIS — E78.00 PURE HYPERCHOLESTEROLEMIA: ICD-10-CM

## 2021-07-13 DIAGNOSIS — I48.0 PAROXYSMAL ATRIAL FIBRILLATION (HCC): Primary | ICD-10-CM

## 2021-07-13 PROCEDURE — 99214 OFFICE O/P EST MOD 30 MIN: CPT | Performed by: INTERNAL MEDICINE

## 2021-07-13 NOTE — PROGRESS NOTES
"    Subjective:     Encounter Date:07/13/2021      Patient ID: Casey Johnson is a 80 y.o. male.    Chief Complaint:  History of Present Illness 80-year-old white male with a history of paroxysmal afibrillation with occasional shortness post pacemaker placement history of hypertension hyperlipidemia presents to my office for follow-up.  Patient is currently stable without incidence of chest pain or shortness of breath at rest or exertion but no complaints any PND orthopnea.  No palpitation dizziness syncope or swelling of the feet.  He feels that there is a buzzing noise in his pacemaker sometimes.    The following portions of the patient's history were reviewed and updated as appropriate: allergies, current medications, past family history, past medical history, past social history, past surgical history and problem list.  Past Medical History:   Diagnosis Date   • Atrial fibrillation (CMS/HCC)    • Cancer (CMS/HCC)     SKIN   • Hyperlipidemia    • Hypertension      Past Surgical History:   Procedure Laterality Date   • CATARACT EXTRACTION, BILATERAL     • EYE SURGERY     • GLAUCOMA SURGERY      X3   • INSERT / REPLACE / REMOVE PACEMAKER     • MOHS SURGERY     • PROSTATE SURGERY       /64 (BP Location: Left arm, Patient Position: Sitting)   Pulse 70   Ht 180.3 cm (71\")   Wt 99.3 kg (219 lb)   SpO2 94%   BMI 30.54 kg/m²   Family History   Problem Relation Age of Onset   • Osteoporosis Mother    • Bell's palsy Mother    • No Known Problems Father    • No Known Problems Sister    • Cancer Brother    • Heart attack Brother    • No Known Problems Maternal Aunt    • No Known Problems Maternal Uncle    • No Known Problems Paternal Aunt    • No Known Problems Paternal Uncle    • No Known Problems Maternal Grandmother    • No Known Problems Maternal Grandfather    • No Known Problems Paternal Grandmother    • No Known Problems Paternal Grandfather    • No Known Problems Other    • Anemia Neg Hx    • Arrhythmia Neg " Hx    • Asthma Neg Hx    • Clotting disorder Neg Hx    • Fainting Neg Hx    • Heart disease Neg Hx    • Heart failure Neg Hx    • Hyperlipidemia Neg Hx    • Hypertension Neg Hx        Current Outpatient Medications:   •  ALPRAZolam (XANAX) 1 MG tablet, Every 8 (Eight) Hours., Disp: , Rfl:   •  bisoprolol-hydrochlorothiazide (ZIAC) 2.5-6.25 MG per tablet, Daily., Disp: , Rfl:   •  cilostazol (PLETAL) 100 MG tablet, TAKE ONE TABLET BY MOUTH TWICE DAILY, Disp: 180 tablet, Rfl: 1  •  erythromycin (ROMYCIN) 5 MG/GM ophthalmic ointment, Administer  into the left eye Every Night., Disp: , Rfl:   •  minocycline (DYNACIN) 100 MG tablet, MINOCYCLINE  MG TABS, Disp: , Rfl:   •  omeprazole (priLOSEC) 40 MG capsule, OMEPRAZOLE 40 MG CPDR, Disp: , Rfl:   •  pravastatin (PRAVACHOL) 40 MG tablet, PRAVASTATIN SODIUM 40 MG TABS, Disp: , Rfl:   •  prednisoLONE acetate (PRED FORTE) 1 % ophthalmic suspension, INSTILL ONE DROP IN THE LEFT EYE FOUR TIMES DAILY, Disp: , Rfl: 3  •  warfarin (COUMADIN) 5 MG tablet, TAKE 1/2 TAB BY MOUTH ON Monday AND Friday. TAKE ONE TABLET BY MOUTH ON ALL OTHER DAYS, OR AS DIRECTED, Disp: 30 tablet, Rfl: 2  Allergies   Allergen Reactions   • Penicillins Unknown (See Comments)   • Sulfamethoxazole-Trimethoprim Hives     Social History     Socioeconomic History   • Marital status:      Spouse name: Not on file   • Number of children: Not on file   • Years of education: Not on file   • Highest education level: Not on file   Tobacco Use   • Smoking status: Former Smoker   • Smokeless tobacco: Never Used   Vaping Use   • Vaping Use: Never used   Substance and Sexual Activity   • Alcohol use: Yes   • Drug use: Never   • Sexual activity: Defer     Review of Systems   Constitutional: Negative for fever and malaise/fatigue.   Cardiovascular: Negative for chest pain, dyspnea on exertion and palpitations.   Respiratory: Negative for cough and shortness of breath.    Skin: Negative for rash.    Gastrointestinal: Negative for abdominal pain, nausea and vomiting.   Neurological: Negative for focal weakness and headaches.   All other systems reviewed and are negative.             Objective:     Constitutional:       Appearance: Well-developed.   Eyes:      General: No scleral icterus.     Conjunctiva/sclera: Conjunctivae normal.   HENT:      Head: Normocephalic and atraumatic.   Neck:      Vascular: No carotid bruit or JVD.   Pulmonary:      Effort: Pulmonary effort is normal.      Breath sounds: Normal breath sounds. No wheezing. No rales.   Cardiovascular:      Normal rate. Regular rhythm.   Pulses:     Intact distal pulses.   Abdominal:      General: Bowel sounds are normal.      Palpations: Abdomen is soft.   Musculoskeletal:      Cervical back: Normal range of motion and neck supple. Skin:     General: Skin is warm and dry.      Findings: No rash.   Neurological:      Mental Status: Alert.       Procedures    Lab Review:         MDM  1.  Paroxysmal atrial fibrillation  Patient is currently stable without any symptoms and is on warfarin and keeps INR between 2.0-3.0  2.  History of tachybradycardia syndrome status post pacemaker placement  Patient's pacemaker is working very well but has a buzzing noise and hence I will reinterrogated again  3.  Hypertension  Patient blood pressure currently stable on medication  4.  Hyperlipidemia  Patient lipid levels are followed by the primary care doctor.

## 2021-08-03 ENCOUNTER — TELEPHONE (OUTPATIENT)
Dept: CARDIOLOGY | Facility: CLINIC | Age: 81
End: 2021-08-03

## 2021-08-04 LAB — INR PPP: 2.1

## 2021-08-05 ENCOUNTER — ANTICOAGULATION VISIT (OUTPATIENT)
Dept: CARDIOLOGY | Facility: CLINIC | Age: 81
End: 2021-08-05

## 2021-08-05 DIAGNOSIS — Z79.01 LONG TERM (CURRENT) USE OF ANTICOAGULANTS: ICD-10-CM

## 2021-08-05 DIAGNOSIS — I48.91 ATRIAL FIBRILLATION, UNSPECIFIED TYPE (HCC): Primary | ICD-10-CM

## 2021-08-06 ENCOUNTER — CLINICAL SUPPORT NO REQUIREMENTS (OUTPATIENT)
Dept: CARDIOLOGY | Facility: CLINIC | Age: 81
End: 2021-08-06

## 2021-08-06 DIAGNOSIS — Z95.0 PRESENCE OF CARDIAC PACEMAKER: ICD-10-CM

## 2021-08-06 DIAGNOSIS — I49.5 TACHY-BRADY SYNDROME (HCC): Primary | ICD-10-CM

## 2021-08-06 DIAGNOSIS — Z95.0 STATUS POST CARDIAC PACEMAKER PROCEDURE: ICD-10-CM

## 2021-08-06 PROCEDURE — 93280 PM DEVICE PROGR EVAL DUAL: CPT | Performed by: INTERNAL MEDICINE

## 2021-08-17 DIAGNOSIS — I48.0 PAROXYSMAL ATRIAL FIBRILLATION (HCC): ICD-10-CM

## 2021-08-17 DIAGNOSIS — Z79.01 LONG TERM (CURRENT) USE OF ANTICOAGULANTS: ICD-10-CM

## 2021-08-17 RX ORDER — WARFARIN SODIUM 5 MG/1
TABLET ORAL
Qty: 30 TABLET | Refills: 2 | Status: SHIPPED | OUTPATIENT
Start: 2021-08-17

## 2021-08-17 NOTE — TELEPHONE ENCOUNTER
Rx Refill Note  Requested Prescriptions     Pending Prescriptions Disp Refills   • warfarin (COUMADIN) 5 MG tablet [Pharmacy Med Name: warfarin 5 mg tablet] 30 tablet 2     Sig: TAKE 1/2 TAB BY MOUTH ON Monday AND Friday. TAKE ONE TABLET BY MOUTH ON ALL OTHER DAYS, OR AS DIRECTED      Last office visit with prescribing clinician: 7/13/2021      Next office visit with prescribing clinician: Visit date not found     Protime-INR (08/04/2021)         Imani Vega MA  08/17/21, 09:28 EDT

## 2021-08-17 NOTE — TELEPHONE ENCOUNTER
Rx Refill Note  Requested Prescriptions     Pending Prescriptions Disp Refills   • warfarin (COUMADIN) 5 MG tablet [Pharmacy Med Name: warfarin 5 mg tablet] 30 tablet 2     Sig: TAKE 1/2 TAB BY MOUTH ON Monday AND Friday. TAKE ONE TABLET BY MOUTH ON ALL OTHER DAYS, OR AS DIRECTED      Last office visit with prescribing clinician: 7/13/2021      Next office visit with prescribing clinician: Visit date not found            Anticoagulation Visit with Nitesh Cortez MD (08/05/2021)      Protime-INR (08/04/2021)      Mayra Jasmine LPN  08/17/21, 10:04 EDT

## 2021-09-22 ENCOUNTER — TELEPHONE (OUTPATIENT)
Dept: CARDIOLOGY | Facility: CLINIC | Age: 81
End: 2021-09-22

## 2021-09-22 NOTE — TELEPHONE ENCOUNTER
Received note from Vera yesterday, regarding patient. Patient was in [facility] ER Monday, daughter states patient's heart rate was in the 170's, blood pressure was 83/47, patient felt extremely bad. Daughter states he was in the ER for 2 hours, and they were sent home, but nothing was done for him. She states he was feeling a little better yesterday, but still feeling bad. I advised Dr. Cortez of this information, and he wanted me to inform the patient to keep track of his blood pressure / heart rate for a week, and follow up with him and Anny next week. Called daughter, transferred to scheduling to schedule.

## 2021-09-27 ENCOUNTER — TRANSCRIBE ORDERS (OUTPATIENT)
Dept: INTERVENTIONAL RADIOLOGY/VASCULAR | Facility: HOSPITAL | Age: 81
End: 2021-09-27

## 2021-09-27 DIAGNOSIS — Z20.822 ENCOUNTER FOR PREPROCEDURE SCREENING LABORATORY TESTING FOR COVID-19: Primary | ICD-10-CM

## 2021-09-27 DIAGNOSIS — Z01.812 ENCOUNTER FOR PREPROCEDURE SCREENING LABORATORY TESTING FOR COVID-19: Primary | ICD-10-CM

## 2021-10-05 ENCOUNTER — APPOINTMENT (OUTPATIENT)
Dept: LAB | Facility: HOSPITAL | Age: 81
End: 2021-10-05

## 2021-10-07 ENCOUNTER — TELEPHONE (OUTPATIENT)
Dept: CARDIOLOGY | Facility: CLINIC | Age: 81
End: 2021-10-07

## 2021-10-07 ENCOUNTER — HOSPITAL ENCOUNTER (OUTPATIENT)
Dept: CT IMAGING | Facility: HOSPITAL | Age: 81
End: 2021-10-07

## 2021-10-07 NOTE — TELEPHONE ENCOUNTER
Called patient, he is now in Hospice care for lung cancer. He has been sleeping a lot.  (Reminder to have INR drawn)